# Patient Record
Sex: FEMALE | Race: OTHER | HISPANIC OR LATINO | URBAN - METROPOLITAN AREA
[De-identification: names, ages, dates, MRNs, and addresses within clinical notes are randomized per-mention and may not be internally consistent; named-entity substitution may affect disease eponyms.]

---

## 2019-10-27 ENCOUNTER — EMERGENCY (EMERGENCY)
Facility: HOSPITAL | Age: 34
LOS: 0 days | Discharge: HOME | End: 2019-10-27
Attending: EMERGENCY MEDICINE | Admitting: EMERGENCY MEDICINE
Payer: SUBSIDIZED

## 2019-10-27 VITALS
TEMPERATURE: 98 F | HEART RATE: 90 BPM | SYSTOLIC BLOOD PRESSURE: 110 MMHG | DIASTOLIC BLOOD PRESSURE: 68 MMHG | OXYGEN SATURATION: 100 % | RESPIRATION RATE: 18 BRPM

## 2019-10-27 VITALS
DIASTOLIC BLOOD PRESSURE: 77 MMHG | HEART RATE: 88 BPM | TEMPERATURE: 98 F | OXYGEN SATURATION: 100 % | SYSTOLIC BLOOD PRESSURE: 115 MMHG | RESPIRATION RATE: 18 BRPM

## 2019-10-27 DIAGNOSIS — R10.9 UNSPECIFIED ABDOMINAL PAIN: ICD-10-CM

## 2019-10-27 DIAGNOSIS — N83.209 UNSPECIFIED OVARIAN CYST, UNSPECIFIED SIDE: ICD-10-CM

## 2019-10-27 DIAGNOSIS — R19.7 DIARRHEA, UNSPECIFIED: ICD-10-CM

## 2019-10-27 LAB
ALBUMIN SERPL ELPH-MCNC: 4.6 G/DL — SIGNIFICANT CHANGE UP (ref 3.5–5.2)
ALP SERPL-CCNC: 54 U/L — SIGNIFICANT CHANGE UP (ref 30–115)
ALT FLD-CCNC: 19 U/L — SIGNIFICANT CHANGE UP (ref 0–41)
ANION GAP SERPL CALC-SCNC: 12 MMOL/L — SIGNIFICANT CHANGE UP (ref 7–14)
APPEARANCE UR: CLEAR — SIGNIFICANT CHANGE UP
AST SERPL-CCNC: 49 U/L — HIGH (ref 0–41)
BASOPHILS # BLD AUTO: 0.03 K/UL — SIGNIFICANT CHANGE UP (ref 0–0.2)
BASOPHILS NFR BLD AUTO: 0.4 % — SIGNIFICANT CHANGE UP (ref 0–1)
BILIRUB DIRECT SERPL-MCNC: <0.2 MG/DL — SIGNIFICANT CHANGE UP (ref 0–0.2)
BILIRUB INDIRECT FLD-MCNC: SIGNIFICANT CHANGE UP MG/DL (ref 0.2–1.2)
BILIRUB SERPL-MCNC: <0.2 MG/DL — SIGNIFICANT CHANGE UP (ref 0.2–1.2)
BILIRUB UR-MCNC: NEGATIVE — SIGNIFICANT CHANGE UP
BUN SERPL-MCNC: 12 MG/DL — SIGNIFICANT CHANGE UP (ref 10–20)
CALCIUM SERPL-MCNC: 9.1 MG/DL — SIGNIFICANT CHANGE UP (ref 8.5–10.1)
CHLORIDE SERPL-SCNC: 101 MMOL/L — SIGNIFICANT CHANGE UP (ref 98–110)
CO2 SERPL-SCNC: 23 MMOL/L — SIGNIFICANT CHANGE UP (ref 17–32)
COLOR SPEC: SIGNIFICANT CHANGE UP
CREAT SERPL-MCNC: 0.6 MG/DL — LOW (ref 0.7–1.5)
DIFF PNL FLD: NEGATIVE — SIGNIFICANT CHANGE UP
EOSINOPHIL # BLD AUTO: 0.11 K/UL — SIGNIFICANT CHANGE UP (ref 0–0.7)
EOSINOPHIL NFR BLD AUTO: 1.6 % — SIGNIFICANT CHANGE UP (ref 0–8)
GLUCOSE SERPL-MCNC: 92 MG/DL — SIGNIFICANT CHANGE UP (ref 70–99)
GLUCOSE UR QL: NEGATIVE — SIGNIFICANT CHANGE UP
HCG SERPL QL: NEGATIVE — SIGNIFICANT CHANGE UP
HCT VFR BLD CALC: 41.3 % — SIGNIFICANT CHANGE UP (ref 37–47)
HGB BLD-MCNC: 13.6 G/DL — SIGNIFICANT CHANGE UP (ref 12–16)
IMM GRANULOCYTES NFR BLD AUTO: 0.6 % — HIGH (ref 0.1–0.3)
KETONES UR-MCNC: NEGATIVE — SIGNIFICANT CHANGE UP
LACTATE SERPL-SCNC: 0.6 MMOL/L — SIGNIFICANT CHANGE UP (ref 0.5–2.2)
LEUKOCYTE ESTERASE UR-ACNC: NEGATIVE — SIGNIFICANT CHANGE UP
LIDOCAIN IGE QN: 43 U/L — SIGNIFICANT CHANGE UP (ref 7–60)
LYMPHOCYTES # BLD AUTO: 1.7 K/UL — SIGNIFICANT CHANGE UP (ref 1.2–3.4)
LYMPHOCYTES # BLD AUTO: 24.3 % — SIGNIFICANT CHANGE UP (ref 20.5–51.1)
MCHC RBC-ENTMCNC: 29.5 PG — SIGNIFICANT CHANGE UP (ref 27–31)
MCHC RBC-ENTMCNC: 32.9 G/DL — SIGNIFICANT CHANGE UP (ref 32–37)
MCV RBC AUTO: 89.6 FL — SIGNIFICANT CHANGE UP (ref 81–99)
MONOCYTES # BLD AUTO: 0.33 K/UL — SIGNIFICANT CHANGE UP (ref 0.1–0.6)
MONOCYTES NFR BLD AUTO: 4.7 % — SIGNIFICANT CHANGE UP (ref 1.7–9.3)
NEUTROPHILS # BLD AUTO: 4.78 K/UL — SIGNIFICANT CHANGE UP (ref 1.4–6.5)
NEUTROPHILS NFR BLD AUTO: 68.4 % — SIGNIFICANT CHANGE UP (ref 42.2–75.2)
NITRITE UR-MCNC: NEGATIVE — SIGNIFICANT CHANGE UP
NRBC # BLD: 0 /100 WBCS — SIGNIFICANT CHANGE UP (ref 0–0)
PH UR: 6.5 — SIGNIFICANT CHANGE UP (ref 5–8)
PLATELET # BLD AUTO: 283 K/UL — SIGNIFICANT CHANGE UP (ref 130–400)
POTASSIUM SERPL-MCNC: 5.3 MMOL/L — HIGH (ref 3.5–5)
POTASSIUM SERPL-SCNC: 5.3 MMOL/L — HIGH (ref 3.5–5)
PROT SERPL-MCNC: 8 G/DL — SIGNIFICANT CHANGE UP (ref 6–8)
PROT UR-MCNC: NEGATIVE — SIGNIFICANT CHANGE UP
RBC # BLD: 4.61 M/UL — SIGNIFICANT CHANGE UP (ref 4.2–5.4)
RBC # FLD: 13.2 % — SIGNIFICANT CHANGE UP (ref 11.5–14.5)
SODIUM SERPL-SCNC: 136 MMOL/L — SIGNIFICANT CHANGE UP (ref 135–146)
SP GR SPEC: 1.01 — SIGNIFICANT CHANGE UP (ref 1.01–1.02)
UROBILINOGEN FLD QL: SIGNIFICANT CHANGE UP
WBC # BLD: 6.99 K/UL — SIGNIFICANT CHANGE UP (ref 4.8–10.8)
WBC # FLD AUTO: 6.99 K/UL — SIGNIFICANT CHANGE UP (ref 4.8–10.8)

## 2019-10-27 PROCEDURE — 76830 TRANSVAGINAL US NON-OB: CPT | Mod: 26

## 2019-10-27 PROCEDURE — 76856 US EXAM PELVIC COMPLETE: CPT | Mod: 26

## 2019-10-27 PROCEDURE — 99284 EMERGENCY DEPT VISIT MOD MDM: CPT

## 2019-10-27 PROCEDURE — 74177 CT ABD & PELVIS W/CONTRAST: CPT | Mod: 26

## 2019-10-27 RX ORDER — SODIUM CHLORIDE 9 MG/ML
1000 INJECTION, SOLUTION INTRAVENOUS ONCE
Refills: 0 | Status: COMPLETED | OUTPATIENT
Start: 2019-10-27 | End: 2019-10-27

## 2019-10-27 RX ADMIN — Medication 20 MILLIGRAM(S): at 14:51

## 2019-10-27 RX ADMIN — SODIUM CHLORIDE 1000 MILLILITER(S): 9 INJECTION, SOLUTION INTRAVENOUS at 12:38

## 2019-10-27 RX ADMIN — SODIUM CHLORIDE 1000 MILLILITER(S): 9 INJECTION, SOLUTION INTRAVENOUS at 14:51

## 2019-10-27 NOTE — ED PROVIDER NOTE - CLINICAL SUMMARY MEDICAL DECISION MAKING FREE TEXT BOX
lower abd pain - ED w/u showing ovarian cyst - all results d/w pt & copies given, strict return precautions discussed, rec outpt Gyn f/u

## 2019-10-27 NOTE — ED PROVIDER NOTE - PATIENT PORTAL LINK FT
You can access the FollowMyHealth Patient Portal offered by [facility] by registering at the following website: [email or web address]. By joining [employer]’s FollowMyHealth portal, you will also be able to view your health information using other applications (apps) compatible with our system.

## 2019-10-27 NOTE — ED PROVIDER NOTE - NSFOLLOWUPINSTRUCTIONS_ED_ALL_ED_FT
Ovarian Cyst    An ovarian cyst is a fluid-filled sac that forms on an ovary. The ovaries are small organs that produce eggs in women. Various types of cysts can form on the ovaries. Most are not cancerous. Many do not cause problems, and they often go away on their own. Some may cause symptoms and require treatment. Common types of ovarian cysts include:     Functional cysts—These cysts may occur every month during the menstrual cycle. This is normal. The cysts usually go away with the next menstrual cycle if the woman does not get pregnant. Usually, there are no symptoms with a functional cyst.  Endometrioma cysts—These cysts form from the tissue that lines the uterus. They are also called "chocolate cysts" because they become filled with blood that turns brown. This type of cyst can cause pain in the lower abdomen during intercourse and with your menstrual period.  Cystadenoma cysts—This type develops from the cells on the outside of the ovary. These cysts can get very big and cause lower abdomen pain and pain with intercourse. This type of cyst can twist on itself, cut off its blood supply, and cause severe pain. It can also easily rupture and cause a lot of pain.  Dermoid cysts—This type of cyst is sometimes found in both ovaries. These cysts may contain different kinds of body tissue, such as skin, teeth, hair, or cartilage. They usually do not cause symptoms unless they get very big.   Theca lutein cysts—These cysts occur when too much of a certain hormone (human chorionic gonadotropin) is produced and overstimulates the ovaries to produce an egg. This is most common after procedures used to assist with the conception of a baby (in vitro fertilization).    CAUSES  Fertility drugs can cause a condition in which multiple large cysts are formed on the ovaries. This is called ovarian hyperstimulation syndrome.   A condition called polycystic ovary syndrome can cause hormonal imbalances that can lead to nonfunctional ovarian cysts.     SIGNS AND SYMPTOMS  Many ovarian cysts do not cause symptoms. If symptoms are present, they may include:    Pelvic pain or pressure.  Pain in the lower abdomen.  Pain during sexual intercourse.  Increasing girth (swelling) of the abdomen.  Abnormal menstrual periods.  Increasing pain with menstrual periods.  Stopping having menstrual periods without being pregnant.    DIAGNOSIS  These cysts are commonly found during a routine or annual pelvic exam. Tests may be ordered to find out more about the cyst. These tests may include:    Ultrasound.  X-ray of the pelvis.  CT scan.  MRI.  Blood tests.    TREATMENT  Many ovarian cysts go away on their own without treatment. Your health care provider may want to check your cyst regularly for 2–3 months to see if it changes. For women in menopause, it is particularly important to monitor a cyst closely because of the higher rate of ovarian cancer in menopausal women. When treatment is needed, it may include any of the following:    A procedure to drain the cyst (aspiration). This may be done using a long needle and ultrasound. It can also be done through a laparoscopic procedure. This involves using a thin, lighted tube with a tiny camera on the end (laparoscope) inserted through a small incision.   Surgery to remove the whole cyst. This may be done using laparoscopic surgery or an open surgery involving a larger incision in the lower abdomen.   Hormone treatment or birth control pills. These methods are sometimes used to help dissolve a cyst.    HOME CARE INSTRUCTIONS  Only take over-the-counter or prescription medicines as directed by your health care provider.   Follow up with your health care provider as directed.   Get regular pelvic exams and Pap tests.    SEEK MEDICAL CARE IF:  Your periods are late, irregular, or painful, or they stop.  Your pelvic pain or abdominal pain does not go away.  Your abdomen becomes larger or swollen.  You have pressure on your bladder or trouble emptying your bladder completely.  You have pain during sexual intercourse.  You have feelings of fullness, pressure, or discomfort in your stomach.  You lose weight for no apparent reason.  You feel generally ill.  You become constipated.  You lose your appetite.  You develop acne.  You have an increase in body and facial hair.  You are gaining weight, without changing your exercise and eating habits.  You think you are pregnant.    SEEK IMMEDIATE MEDICAL CARE IF:  You have increasing abdominal pain.  You feel sick to your stomach (nauseous), and you throw up (vomit).  You develop a fever that comes on suddenly.  You have abdominal pain during a bowel movement.  Your menstrual periods become heavier than usual. Quiste ovárico  (Ovarian Cyst)    Un quiste ovárico es myah bolsa llena de líquido que se forma en el ovario. Los ovarios son los órganos pequeños que producen óvulos en las mujeres. Se pueden formar varios tipos de quistes en los ovarios. Algunos pueden provocar síntomas y requerir tratamiento. La mayoría de los quistes ováricos desaparecen por sí solos, no son cancerosos (son benignos) y no causan problemas.     Los tipos más comunes de quistes ováricos son los siguientes:    Quistes funcionales (folículos).  Ocurren shivani el ciclo menstrual y suelen desaparecer con el siguiente ciclo menstrual si no queda embarazada.  No suelen causar síntomas.  Endometriomas.  Son quistes formados por el tejido que recubre el útero (endometrio).  A veces se denominan "quistes de chocolate" porque se llenan de bria que se vuelve marrón.  Marcus tipo de quiste puede provocar dolor en la juliano inferior del abdomen shivani la relación sexual y el período menstrual.  Cistoadenomas.  Se desarrollan a partir de células que se encuentran en la superficie externa del ovario.  Pueden agrandarse mucho y causar dolor en la juliano inferior del abdomen y con la relación sexual.  Pueden provocar dolor intenso si se tuercen o se rompen (ruptura).  Quistes dermoides.  A veces se encuentran en ambos ovarios.  Estos quistes pueden contener diferentes tipos de tejidos del organismo, gretta piel, dientes, pelo o cartílago.  Generalmente no tienen síntomas, a menos que mian muy grandes.  Quistes tecaluteísticos.  Aparecen cuando se produce demasiada cantidad de cierta hormona (gonadotropina coriónica humana) que estimula en exceso al ovario para que produzca óvulos.  Son más frecuentes después de procedimientos que ayudan a la coco de un bebé (fertilización in vitro).    CAUSAS  Algunas de las causas de los quistes ováricos son las siguientes:     Síndrome de hiperestimulación ovárica. Esta es myah afección que puede aparecer debido a la nir de medicamentos para la fertilidad. Provoca la formación de varios quistes ováricos grandes.  Síndrome de ovario poliquístico (SOP). Marcus es un trastorno hormonal frecuente que puede producir quistes ováricos, además de problemas en el período o la fertilidad.    FACTORES DE RIESGO  Los siguientes factores pueden hacer que usted sea más propensa a desarrollar quistes ováricos:    Tener exceso de peso u obesidad.  No medicamentos para la fertilidad.  Usar ciertas formas de regulación hormonal de la natalidad.  Fumar.    SÍNTOMAS  Muchos quistes ováricos no causan síntomas. Si se presentan síntomas, estos pueden ser:    Dolor o molestias en la pelvis.  Dolor en la parte baja del abdomen.  Dolor shivani las relaciones sexuales.  Hinchazón abdominal.  Períodos menstruales anormales.  Aumento del dolor en los períodos menstruales.    DIAGNÓSTICO  Estos quistes se descubren comúnmente shivani un examen de rutina o myah exploración ginecológica. Puede realizarse exámenes para obtener más información sobre el quiste, gretta los siguientes:    Ecografía.  Radiografías de la pelvis.  Tomografía computarizada (TC).  Resonancia magnética (RM).  Análisis de bria.    TRATAMIENTO  Muchos de los quistes ováricos desaparecen por sí solos, sin tratamiento. Es probable que el médico quiera controlar el quiste regularmente shivani 2 o 3 meses para lorrie si se produce algún cambio. Si está en la menopausia, es especialmente importante controlar el quiste cuidadosamente porque las mujeres menopáusicas tienen un índice mayor de cáncer de ovario.    Si se necesita tratamiento, marcus puede incluir lo siguiente:    No medicamentos para aliviar el dolor.  Un procedimiento para drenar el quiste (aspiración).  Cirugía para extirpar el quiste completo.  Tratamiento hormonal o píldoras anticonceptivas. Estos métodos a veces se usan para ayudar a disolver un quiste.    INSTRUCCIONES PARA EL CUIDADO EN EL HOGAR  [place] los medicamentos de venta damián y los recetados solamente gretta se lo haya indicado el médico.  No conduzca ni use maquinaria pesada mientras nir analgésicos recetados.  Realícese exámenes pélvicos periódicos y pruebas de Papanicolaou con la frecuencia que le indique el médico.  Retome amy actividades normales gretta se lo haya indicado el médico. Pregúntele al médico qué actividades son seguras para usted.  No consuma ningún producto que contenga tabaco o nicotina, gretta cigarrillos y cigarrillos electrónicos. Si necesita ayuda para dejar de fumar, consulte al médico.  Concurra a todas las visitas de control gretta se lo haya indicado el médico. [place] es importante.    SOLICITE ATENCIÓN MÉDICA SI:  Los períodos se atrasan, son irregulares, dolorosos o cesan.  Tiene dolor pélvico que no desaparece.  Siente presión en la vejiga o no puede vaciarla completamente.  Siente dolor shivani las relaciones sexuales.  Tiene alguno de los siguientes síntomas en el abdomen:  Sensación de tener el estómago lleno.  Presión.  Molestias.  Dolor que persiste.  Hinchazón.  Siente un malestar generalizado.  Tiene estreñimiento.  Pierde el apetito.  Presenta acné grave.  Empieza a tener más lópez corporal y facial.  Aumenta o disminuye de peso sin hacer modificaciones en calderon actividad física y en calderon dieta habitual.  Suzan que puede estar embarazada.    SOLICITE ATENCIÓN MÉDICA DE INMEDIATO SI:  Tiene un dolor abdominal intenso o que empeora.  No puede comer ni beber sin vomitar.  Tiene fiebre de manera repentina.  Calderon período menstrual es mucho más profuso que lo normal.    NOTAS ADICIONALES E INSTRUCCIONES    Please follow up with your Primary MD in 24-48 hr.  Seek immediate medical care for any new/worsening signs or symptoms.

## 2019-10-27 NOTE — ED PROVIDER NOTE - NS ED ROS FT
Constitutional: (-) fever  Eyes/ENT: (-) blurry vision, (-) epistaxis  Cardiovascular: (-) chest pain, (-) syncope  Respiratory: (-) cough, (-) shortness of breath  Gastrointestinal: (+) diarrhea, (+) cramping, (-) vomiting  : (-) dysuria, (-) hematuria  Musculoskeletal: (-) neck pain, (-) back pain, (-) joint pain  Integumentary: (-) rash, (-) edema  Neurological: (-) headache, (-) altered mental status  Allergic/Immunologic: (-) pruritus

## 2019-10-27 NOTE — ED ADULT NURSE NOTE - INTERPRETATION SERVICES DECLINED
family atbedside, translating; refuses to use the  phone atthis time/Patient/Caregiver requests family/friend to interpret.

## 2019-10-27 NOTE — ED PROVIDER NOTE - OBJECTIVE STATEMENT
34y F no pmh presents for evaluation of abdominal pain. Pt states she has had intermittent cramping lower abdominal pain x5 days, no aggravating or reliving factors. Associated diarrhea roughly every 15-20min. Denies fever, ha, cp, sob, weakness, numbness, travel, n/v

## 2019-10-27 NOTE — ED PROVIDER NOTE - PHYSICAL EXAMINATION
CONST: Well appearing in NAD  EYES: PERRL, EOMI, Sclera and conjunctiva clear.   ENT: No nasal discharge. Oropharynx normal appearing, no erythema or exudates. No abscess or swelling. Uvula midline.   NECK: Non-tender, no meningeal signs. normal ROM. supple   CARD: S1 S2; No m/r/g  RESP: Equal BS B/L, No wheezes, rhonchi or rales. No distress  GI: Soft, non-tender, non-distended. no cva tenderness. normal BS  MS: Normal ROM in all extremities. pulses 2 +. no calf tenderness or swelling  SKIN: Warm, dry, no acute rashes. Good turgor  NEURO: A&Ox3, No focal deficits. Strength 5/5 with no sensory deficits.

## 2019-10-27 NOTE — ED PROVIDER NOTE - ATTENDING CONTRIBUTION TO CARE
34y f no pmh p/w diarrhea x 5d. Rpts multiple episodes loose brown stools, now incr in frequency, accomp by intermitt lower abd cramping. No f/c, uri sx, cough, cp/sob, nv, flank pain, urinary sx, rash. No sick contacts, recent travel or abx. PE: young f wdwn nad, ncat, neck supple, rrr nl s1s2 no mrg, ctab no wrr, abd soft ntnd no palpable masses no rgr, no cvat, ext no cce dpi.

## 2019-10-28 PROBLEM — Z78.9 OTHER SPECIFIED HEALTH STATUS: Chronic | Status: ACTIVE | Noted: 2019-10-27

## 2019-10-29 LAB
-  AMIKACIN: SIGNIFICANT CHANGE UP
-  AMPICILLIN/SULBACTAM: SIGNIFICANT CHANGE UP
-  AMPICILLIN: SIGNIFICANT CHANGE UP
-  AZTREONAM: SIGNIFICANT CHANGE UP
-  CEFAZOLIN: SIGNIFICANT CHANGE UP
-  CEFEPIME: SIGNIFICANT CHANGE UP
-  CEFOXITIN: SIGNIFICANT CHANGE UP
-  CEFTRIAXONE: SIGNIFICANT CHANGE UP
-  CIPROFLOXACIN: SIGNIFICANT CHANGE UP
-  GENTAMICIN: SIGNIFICANT CHANGE UP
-  LEVOFLOXACIN: SIGNIFICANT CHANGE UP
-  MEROPENEM: SIGNIFICANT CHANGE UP
-  NITROFURANTOIN: SIGNIFICANT CHANGE UP
-  PIPERACILLIN/TAZOBACTAM: SIGNIFICANT CHANGE UP
-  TOBRAMYCIN: SIGNIFICANT CHANGE UP
-  TRIMETHOPRIM/SULFAMETHOXAZOLE: SIGNIFICANT CHANGE UP
CULTURE RESULTS: SIGNIFICANT CHANGE UP
METHOD TYPE: SIGNIFICANT CHANGE UP
ORGANISM # SPEC MICROSCOPIC CNT: SIGNIFICANT CHANGE UP
ORGANISM # SPEC MICROSCOPIC CNT: SIGNIFICANT CHANGE UP
SPECIMEN SOURCE: SIGNIFICANT CHANGE UP

## 2019-10-30 RX ORDER — CEFUROXIME AXETIL 250 MG
1 TABLET ORAL
Qty: 14 | Refills: 0
Start: 2019-10-30 | End: 2019-11-05

## 2019-12-03 ENCOUNTER — OUTPATIENT (OUTPATIENT)
Dept: OUTPATIENT SERVICES | Facility: HOSPITAL | Age: 34
LOS: 1 days | Discharge: HOME | End: 2019-12-03

## 2019-12-03 ENCOUNTER — RESULT CHARGE (OUTPATIENT)
Age: 34
End: 2019-12-03

## 2019-12-03 ENCOUNTER — APPOINTMENT (OUTPATIENT)
Dept: OBGYN | Facility: CLINIC | Age: 34
End: 2019-12-03
Payer: COMMERCIAL

## 2019-12-03 VITALS — DIASTOLIC BLOOD PRESSURE: 70 MMHG | SYSTOLIC BLOOD PRESSURE: 100 MMHG | WEIGHT: 160 LBS

## 2019-12-03 DIAGNOSIS — Z00.00 ENCOUNTER FOR GENERAL ADULT MEDICAL EXAMINATION WITHOUT ABNORMAL FINDINGS: ICD-10-CM

## 2019-12-03 DIAGNOSIS — Z00.00 ENCOUNTER FOR GENERAL ADULT MEDICAL EXAMINATION W/OUT ABNORMAL FINDINGS: ICD-10-CM

## 2019-12-03 PROCEDURE — 99385 PREV VISIT NEW AGE 18-39: CPT

## 2019-12-03 PROCEDURE — 99202 OFFICE O/P NEW SF 15 MIN: CPT | Mod: 25

## 2019-12-03 NOTE — HISTORY OF PRESENT ILLNESS
[Last Pap ___] : Last cervical pap smear was [unfilled] [Reproductive Age] : is of reproductive age [Contraception] : uses contraception [Sexually Active] : is sexually active [de-identified] : btl

## 2019-12-04 LAB — HCG UR QL: NEGATIVE

## 2019-12-06 DIAGNOSIS — N83.209 UNSPECIFIED OVARIAN CYST, UNSPECIFIED SIDE: ICD-10-CM

## 2019-12-10 LAB — HPV HIGH+LOW RISK DNA PNL CVX: NOT DETECTED

## 2019-12-12 LAB
A VAGINAE DNA VAG QL NAA+PROBE: NORMAL
BVAB2 DNA VAG QL NAA+PROBE: NORMAL
C KRUSEI DNA VAG QL NAA+PROBE: NEGATIVE
C KRUSEI DNA VAG QL NAA+PROBE: POSITIVE
C TRACH RRNA SPEC QL NAA+PROBE: NEGATIVE
MEGA1 DNA VAG QL NAA+PROBE: NORMAL
N GONORRHOEA RRNA SPEC QL NAA+PROBE: NEGATIVE
T VAGINALIS RRNA SPEC QL NAA+PROBE: NEGATIVE

## 2020-08-14 NOTE — ED ADULT NURSE NOTE - NS ED NURSE LEVEL OF CONSCIOUSNESS MENTAL STATUS
Benefits, risks, and possible complications of procedure explained to patient/caregiver who verbalized understanding and gave verbal consent. Benefits, risks, and possible complications of procedure explained to patient/caregiver who verbalized understanding and gave verbal consent. Awake

## 2022-02-28 NOTE — ED ADULT NURSE NOTE - NS ED NURSE LEVEL OF CONSCIOUSNESS AFFECT
Calm Right index finger- counseling given on benign nature of the problem  Refer patient to hand surgeon

## 2022-09-15 NOTE — ED PROVIDER NOTE - INTERNATIONAL TRAVEL
Social work: The following facilities are not able to accept d/t NG: [facility], [facility], [address] [facility], [address] [facility], [address] [facility], [address] [place] [address]. No